# Patient Record
Sex: FEMALE | Race: WHITE
[De-identification: names, ages, dates, MRNs, and addresses within clinical notes are randomized per-mention and may not be internally consistent; named-entity substitution may affect disease eponyms.]

---

## 2022-12-15 ENCOUNTER — HOSPITAL ENCOUNTER (OUTPATIENT)
Dept: HOSPITAL 53 - M PLALAB | Age: 32
End: 2022-12-15
Attending: NURSE PRACTITIONER
Payer: COMMERCIAL

## 2022-12-15 DIAGNOSIS — L68.0: Primary | ICD-10-CM

## 2022-12-15 DIAGNOSIS — Z12.4: Primary | ICD-10-CM

## 2022-12-15 LAB
EST. AVERAGE GLUCOSE BLD GHB EST-MCNC: 108 MG/DL (ref 60–110)
PROLACTIN SERPL-MCNC: 4.66 NG/ML
T4 FREE SERPL-MCNC: 1.15 NG/DL (ref 0.89–1.76)
TSH SERPL DL<=0.005 MIU/L-ACNC: 0.98 UIU/ML (ref 0.55–4.78)

## 2022-12-15 PROCEDURE — 87624 HPV HI-RISK TYP POOLED RSLT: CPT

## 2024-12-24 ENCOUNTER — HOSPITAL ENCOUNTER (EMERGENCY)
Dept: HOSPITAL 53 - M ED | Age: 34
LOS: 1 days | Discharge: HOME | End: 2024-12-25
Payer: COMMERCIAL

## 2024-12-24 VITALS — WEIGHT: 293 LBS | BODY MASS INDEX: 41.95 KG/M2 | HEIGHT: 70 IN
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
## 2024-12-24 DIAGNOSIS — Z91.010: ICD-10-CM

## 2024-12-24 DIAGNOSIS — Z79.899: ICD-10-CM

## 2024-12-24 DIAGNOSIS — S30.0XXA: ICD-10-CM

## 2024-12-24 DIAGNOSIS — S70.01XA: Primary | ICD-10-CM

## 2024-12-24 DIAGNOSIS — Z88.5: ICD-10-CM
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
## 2024-12-24 DIAGNOSIS — Z91.040: ICD-10-CM
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
## 2024-12-24 PROCEDURE — 72131 CT LUMBAR SPINE W/O DYE: CPT

## 2024-12-24 PROCEDURE — 96374 THER/PROPH/DIAG INJ IV PUSH: CPT

## 2024-12-24 PROCEDURE — 72128 CT CHEST SPINE W/O DYE: CPT

## 2024-12-24 PROCEDURE — 99284 EMERGENCY DEPT VISIT MOD MDM: CPT

## 2024-12-24 PROCEDURE — 72125 CT NECK SPINE W/O DYE: CPT

## 2024-12-24 PROCEDURE — 72192 CT PELVIS W/O DYE: CPT

## 2024-12-25 VITALS — TEMPERATURE: 97.3 F | OXYGEN SATURATION: 97 % | SYSTOLIC BLOOD PRESSURE: 120 MMHG | DIASTOLIC BLOOD PRESSURE: 61 MMHG

## 2024-12-25 RX ADMIN — DIAZEPAM ONE MG: 5 TABLET ORAL at 01:23

## 2024-12-25 RX ADMIN — DIAZEPAM ONE MG: 10 INJECTION, SOLUTION INTRAMUSCULAR; INTRAVENOUS at 00:55

## 2024-12-30 ENCOUNTER — HOSPITAL ENCOUNTER (EMERGENCY)
Dept: HOSPITAL 53 - M ED | Age: 34
Discharge: HOME | End: 2024-12-30
Payer: COMMERCIAL

## 2024-12-30 VITALS — HEIGHT: 70 IN | BODY MASS INDEX: 41.95 KG/M2 | WEIGHT: 293 LBS

## 2024-12-30 VITALS — SYSTOLIC BLOOD PRESSURE: 139 MMHG | DIASTOLIC BLOOD PRESSURE: 84 MMHG | TEMPERATURE: 97.5 F | OXYGEN SATURATION: 96 %

## 2024-12-30 DIAGNOSIS — Z91.040: ICD-10-CM
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
## 2024-12-30 DIAGNOSIS — S06.0X0A: Primary | ICD-10-CM

## 2024-12-30 DIAGNOSIS — Z91.010: ICD-10-CM

## 2024-12-30 DIAGNOSIS — K59.00: ICD-10-CM
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
## 2024-12-30 DIAGNOSIS — F41.9: ICD-10-CM
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
## 2024-12-30 DIAGNOSIS — F32.A: ICD-10-CM

## 2024-12-30 DIAGNOSIS — I10: ICD-10-CM

## 2024-12-30 DIAGNOSIS — K21.9: ICD-10-CM

## 2024-12-30 DIAGNOSIS — Z79.899: ICD-10-CM

## 2024-12-30 DIAGNOSIS — Z88.5: ICD-10-CM

## 2024-12-30 LAB
ALBUMIN SERPL BCG-MCNC: 3.9 G/DL (ref 3.2–5.2)
ALP SERPL-CCNC: 90 U/L (ref 35–104)
ALT SERPL W P-5'-P-CCNC: 42 U/L (ref 7–40)
APPEARANCE UR: CLEAR
AST SERPL-CCNC: 27 U/L (ref ?–34)
BACTERIA UR QL AUTO: (no result)
BASOPHILS # BLD AUTO: 0 10^3/UL (ref 0–0.2)
BASOPHILS NFR BLD AUTO: 0.3 % (ref 0–1)
BILIRUB CONJ SERPL-MCNC: < 0.1 MG/DL (ref ?–0.4)
BILIRUB SERPL-MCNC: 0.3 MG/DL (ref 0.3–1.2)
BILIRUB UR QL STRIP.AUTO: NEGATIVE
EOSINOPHIL # BLD AUTO: 0.1 10^3/UL (ref 0–0.5)
EOSINOPHIL NFR BLD AUTO: 1.4 % (ref 0–3)
GLUCOSE UR QL STRIP.AUTO: NEGATIVE MG/DL
HCG UR QL: NEGATIVE
HCT VFR BLD AUTO: 42.7 % (ref 36–47)
HGB BLD-MCNC: 13.9 G/DL (ref 12–15.5)
HGB UR QL STRIP.AUTO: NEGATIVE
KETONES UR QL STRIP.AUTO: NEGATIVE MG/DL
LEUKOCYTE ESTERASE UR QL STRIP.AUTO: NEGATIVE
LIPASE SERPL-CCNC: 34 U/L (ref 12–53)
LYMPHOCYTES # BLD AUTO: 2.2 10^3/UL (ref 1.5–5)
LYMPHOCYTES NFR BLD AUTO: 30.1 % (ref 24–44)
MCH RBC QN AUTO: 28.4 PG (ref 27–33)
MCHC RBC AUTO-ENTMCNC: 32.6 G/DL (ref 32–36.5)
MCV RBC AUTO: 87.1 FL (ref 80–96)
MONOCYTES # BLD AUTO: 0.5 10^3/UL (ref 0–0.8)
MONOCYTES NFR BLD AUTO: 6.9 % (ref 2–8)
MUCOUS THREADS URNS QL MICRO: (no result)
NEUTROPHILS # BLD AUTO: 4.4 10^3/UL (ref 1.5–8.5)
NEUTROPHILS NFR BLD AUTO: 61 % (ref 36–66)
NITRITE UR QL STRIP.AUTO: NEGATIVE
PH UR STRIP.AUTO: 7 UNITS (ref 5–9)
PLATELET # BLD AUTO: 263 10^3/UL (ref 150–450)
PROT SERPL-MCNC: 7.7 G/DL (ref 5.7–8.2)
PROT UR QL STRIP.AUTO: NEGATIVE MG/DL
RBC # BLD AUTO: 4.9 10^6/UL (ref 4–5.4)
RBC # UR AUTO: 0 /HPF (ref 0–3)
SP GR UR STRIP.AUTO: 1 (ref 1–1.03)
SQUAMOUS #/AREA URNS AUTO: 0 /HPF (ref 0–6)
UROBILINOGEN UR QL STRIP.AUTO: 0.2 MG/DL (ref 0–2)
WBC # BLD AUTO: 7.2 10^3/UL (ref 4–10)
WBC #/AREA URNS AUTO: 0 /HPF (ref 0–3)

## 2024-12-30 PROCEDURE — 85025 COMPLETE CBC W/AUTO DIFF WBC: CPT

## 2024-12-30 PROCEDURE — 80047 BASIC METABLC PNL IONIZED CA: CPT

## 2024-12-30 PROCEDURE — 76705 ECHO EXAM OF ABDOMEN: CPT

## 2024-12-30 PROCEDURE — 84703 CHORIONIC GONADOTROPIN ASSAY: CPT

## 2024-12-30 PROCEDURE — 70450 CT HEAD/BRAIN W/O DYE: CPT

## 2024-12-30 PROCEDURE — 96374 THER/PROPH/DIAG INJ IV PUSH: CPT

## 2024-12-30 PROCEDURE — 99284 EMERGENCY DEPT VISIT MOD MDM: CPT

## 2024-12-30 PROCEDURE — 74177 CT ABD & PELVIS W/CONTRAST: CPT

## 2024-12-30 PROCEDURE — 36415 COLL VENOUS BLD VENIPUNCTURE: CPT

## 2024-12-30 PROCEDURE — 87581 M.PNEUMON DNA AMP PROBE: CPT

## 2024-12-30 PROCEDURE — 83690 ASSAY OF LIPASE: CPT

## 2024-12-30 PROCEDURE — 87798 DETECT AGENT NOS DNA AMP: CPT

## 2024-12-30 PROCEDURE — 80076 HEPATIC FUNCTION PANEL: CPT

## 2024-12-30 PROCEDURE — 81001 URINALYSIS AUTO W/SCOPE: CPT

## 2024-12-30 PROCEDURE — 87486 CHLMYD PNEUM DNA AMP PROBE: CPT

## 2024-12-30 PROCEDURE — 87633 RESP VIRUS 12-25 TARGETS: CPT

## 2024-12-30 RX ADMIN — ACETAMINOPHEN ONE MLS/HR: 10 INJECTION, SOLUTION INTRAVENOUS at 19:45

## 2025-05-22 ENCOUNTER — HOSPITAL ENCOUNTER (OUTPATIENT)
Dept: HOSPITAL 53 - M WHC | Age: 35
End: 2025-05-22
Payer: COMMERCIAL

## 2025-05-22 DIAGNOSIS — T83.32XA: Primary | ICD-10-CM

## 2025-05-22 DIAGNOSIS — D25.9: ICD-10-CM
